# Patient Record
Sex: MALE | Race: WHITE | NOT HISPANIC OR LATINO | URBAN - METROPOLITAN AREA
[De-identification: names, ages, dates, MRNs, and addresses within clinical notes are randomized per-mention and may not be internally consistent; named-entity substitution may affect disease eponyms.]

---

## 2019-06-01 ENCOUNTER — TRANSCRIBE ORDERS (OUTPATIENT)
Dept: ADMINISTRATIVE | Facility: HOSPITAL | Age: 34
End: 2019-06-01

## 2019-06-01 DIAGNOSIS — Z00.00 WELLNESS EXAMINATION: Primary | ICD-10-CM

## 2019-07-03 ENCOUNTER — APPOINTMENT (OUTPATIENT)
Dept: CT IMAGING | Facility: HOSPITAL | Age: 34
End: 2019-07-03

## 2019-07-03 ENCOUNTER — APPOINTMENT (OUTPATIENT)
Dept: CARDIOLOGY | Facility: HOSPITAL | Age: 34
End: 2019-07-03

## 2020-05-05 ENCOUNTER — APPOINTMENT (OUTPATIENT)
Dept: CARDIOLOGY | Facility: HOSPITAL | Age: 35
End: 2020-05-05

## 2020-05-05 ENCOUNTER — APPOINTMENT (OUTPATIENT)
Dept: CT IMAGING | Facility: HOSPITAL | Age: 35
End: 2020-05-05

## 2022-03-06 ENCOUNTER — HOSPITAL ENCOUNTER (OUTPATIENT)
Facility: HOSPITAL | Age: 37
Setting detail: OBSERVATION
Discharge: HOME | End: 2022-03-08
Attending: EMERGENCY MEDICINE | Admitting: INTERNAL MEDICINE

## 2022-03-06 DIAGNOSIS — R11.2 NAUSEA AND VOMITING, INTRACTABILITY OF VOMITING NOT SPECIFIED, UNSPECIFIED VOMITING TYPE: ICD-10-CM

## 2022-03-06 DIAGNOSIS — M62.82 NON-TRAUMATIC RHABDOMYOLYSIS: Primary | ICD-10-CM

## 2022-03-06 DIAGNOSIS — N17.9 AKI (ACUTE KIDNEY INJURY): ICD-10-CM

## 2022-03-06 LAB
ALBUMIN SERPL-MCNC: 4.9 G/DL (ref 3.5–5.2)
ALBUMIN/GLOB SERPL: 1.8 G/DL
ALP SERPL-CCNC: 65 U/L (ref 39–117)
ALT SERPL W P-5'-P-CCNC: 46 U/L (ref 1–41)
ANION GAP SERPL CALCULATED.3IONS-SCNC: 12 MMOL/L (ref 5–15)
AST SERPL-CCNC: 61 U/L (ref 1–40)
B PARAPERT DNA SPEC QL NAA+PROBE: NOT DETECTED
B PERT DNA SPEC QL NAA+PROBE: NOT DETECTED
BASOPHILS # BLD AUTO: 0 10*3/MM3 (ref 0–0.2)
BASOPHILS NFR BLD AUTO: 0.2 % (ref 0–1.5)
BILIRUB SERPL-MCNC: 1 MG/DL (ref 0–1.2)
BILIRUB UR QL STRIP: NEGATIVE
BUN SERPL-MCNC: 21 MG/DL (ref 6–20)
BUN/CREAT SERPL: 16 (ref 7–25)
C PNEUM DNA NPH QL NAA+NON-PROBE: NOT DETECTED
CALCIUM SPEC-SCNC: 9.2 MG/DL (ref 8.6–10.5)
CHLORIDE SERPL-SCNC: 101 MMOL/L (ref 98–107)
CK SERPL-CCNC: 1035 U/L (ref 20–200)
CLARITY UR: CLEAR
CO2 SERPL-SCNC: 27 MMOL/L (ref 22–29)
COLOR UR: ABNORMAL
CREAT SERPL-MCNC: 1.31 MG/DL (ref 0.76–1.27)
DEPRECATED RDW RBC AUTO: 38.9 FL (ref 37–54)
EGFRCR SERPLBLD CKD-EPI 2021: 71.9 ML/MIN/1.73
EOSINOPHIL # BLD AUTO: 0.1 10*3/MM3 (ref 0–0.4)
EOSINOPHIL NFR BLD AUTO: 0.6 % (ref 0.3–6.2)
ERYTHROCYTE [DISTWIDTH] IN BLOOD BY AUTOMATED COUNT: 12.6 % (ref 12.3–15.4)
FLUAV SUBTYP SPEC NAA+PROBE: NOT DETECTED
FLUBV RNA ISLT QL NAA+PROBE: NOT DETECTED
GLOBULIN UR ELPH-MCNC: 2.7 GM/DL
GLUCOSE SERPL-MCNC: 86 MG/DL (ref 65–99)
GLUCOSE UR STRIP-MCNC: NEGATIVE MG/DL
HADV DNA SPEC NAA+PROBE: NOT DETECTED
HCOV 229E RNA SPEC QL NAA+PROBE: NOT DETECTED
HCOV HKU1 RNA SPEC QL NAA+PROBE: NOT DETECTED
HCOV NL63 RNA SPEC QL NAA+PROBE: NOT DETECTED
HCOV OC43 RNA SPEC QL NAA+PROBE: NOT DETECTED
HCT VFR BLD AUTO: 45 % (ref 37.5–51)
HGB BLD-MCNC: 15.6 G/DL (ref 13–17.7)
HGB UR QL STRIP.AUTO: NEGATIVE
HMPV RNA NPH QL NAA+NON-PROBE: NOT DETECTED
HPIV1 RNA ISLT QL NAA+PROBE: NOT DETECTED
HPIV2 RNA SPEC QL NAA+PROBE: NOT DETECTED
HPIV3 RNA NPH QL NAA+PROBE: NOT DETECTED
HPIV4 P GENE NPH QL NAA+PROBE: NOT DETECTED
KETONES UR QL STRIP: ABNORMAL
LEUKOCYTE ESTERASE UR QL STRIP.AUTO: NEGATIVE
LIPASE SERPL-CCNC: 30 U/L (ref 13–60)
LYMPHOCYTES # BLD AUTO: 0.6 10*3/MM3 (ref 0.7–3.1)
LYMPHOCYTES NFR BLD AUTO: 5.3 % (ref 19.6–45.3)
M PNEUMO IGG SER IA-ACNC: NOT DETECTED
MCH RBC QN AUTO: 30.2 PG (ref 26.6–33)
MCHC RBC AUTO-ENTMCNC: 34.5 G/DL (ref 31.5–35.7)
MCV RBC AUTO: 87.4 FL (ref 79–97)
MONOCYTES # BLD AUTO: 0.4 10*3/MM3 (ref 0.1–0.9)
MONOCYTES NFR BLD AUTO: 3.3 % (ref 5–12)
NEUTROPHILS NFR BLD AUTO: 10.6 10*3/MM3 (ref 1.7–7)
NEUTROPHILS NFR BLD AUTO: 90.6 % (ref 42.7–76)
NITRITE UR QL STRIP: NEGATIVE
NRBC BLD AUTO-RTO: 0.2 /100 WBC (ref 0–0.2)
PH UR STRIP.AUTO: 6 [PH] (ref 5–8)
PLATELET # BLD AUTO: 250 10*3/MM3 (ref 140–450)
PMV BLD AUTO: 8.6 FL (ref 6–12)
POTASSIUM SERPL-SCNC: 4.2 MMOL/L (ref 3.5–5.2)
PROT SERPL-MCNC: 7.6 G/DL (ref 6–8.5)
PROT UR QL STRIP: NEGATIVE
RBC # BLD AUTO: 5.15 10*6/MM3 (ref 4.14–5.8)
RHINOVIRUS RNA SPEC NAA+PROBE: NOT DETECTED
RSV RNA NPH QL NAA+NON-PROBE: NOT DETECTED
SARS-COV-2 RNA NPH QL NAA+NON-PROBE: NOT DETECTED
SODIUM SERPL-SCNC: 140 MMOL/L (ref 136–145)
SP GR UR STRIP: 1.03 (ref 1–1.03)
UROBILINOGEN UR QL STRIP: ABNORMAL
WBC NRBC COR # BLD: 11.7 10*3/MM3 (ref 3.4–10.8)

## 2022-03-06 PROCEDURE — 82550 ASSAY OF CK (CPK): CPT | Performed by: PHYSICIAN ASSISTANT

## 2022-03-06 PROCEDURE — 81003 URINALYSIS AUTO W/O SCOPE: CPT | Performed by: PHYSICIAN ASSISTANT

## 2022-03-06 PROCEDURE — 51798 US URINE CAPACITY MEASURE: CPT

## 2022-03-06 PROCEDURE — 80053 COMPREHEN METABOLIC PANEL: CPT | Performed by: PHYSICIAN ASSISTANT

## 2022-03-06 PROCEDURE — P9612 CATHETERIZE FOR URINE SPEC: HCPCS

## 2022-03-06 PROCEDURE — 83690 ASSAY OF LIPASE: CPT | Performed by: PHYSICIAN ASSISTANT

## 2022-03-06 PROCEDURE — 25010000002 ONDANSETRON PER 1 MG: Performed by: PHYSICIAN ASSISTANT

## 2022-03-06 PROCEDURE — 0202U NFCT DS 22 TRGT SARS-COV-2: CPT | Performed by: PHYSICIAN ASSISTANT

## 2022-03-06 PROCEDURE — 85025 COMPLETE CBC W/AUTO DIFF WBC: CPT | Performed by: PHYSICIAN ASSISTANT

## 2022-03-06 PROCEDURE — 99284 EMERGENCY DEPT VISIT MOD MDM: CPT

## 2022-03-06 PROCEDURE — 96374 THER/PROPH/DIAG INJ IV PUSH: CPT

## 2022-03-06 RX ORDER — SODIUM CHLORIDE 0.9 % (FLUSH) 0.9 %
10 SYRINGE (ML) INJECTION AS NEEDED
Status: DISCONTINUED | OUTPATIENT
Start: 2022-03-06 | End: 2022-03-08 | Stop reason: HOSPADM

## 2022-03-06 RX ORDER — ONDANSETRON 2 MG/ML
4 INJECTION INTRAMUSCULAR; INTRAVENOUS ONCE
Status: COMPLETED | OUTPATIENT
Start: 2022-03-06 | End: 2022-03-06

## 2022-03-06 RX ADMIN — ONDANSETRON 4 MG: 2 INJECTION INTRAMUSCULAR; INTRAVENOUS at 22:07

## 2022-03-06 RX ADMIN — SODIUM CHLORIDE 1000 ML: 9 INJECTION, SOLUTION INTRAVENOUS at 22:44

## 2022-03-06 RX ADMIN — SODIUM CHLORIDE 500 ML: 9 INJECTION, SOLUTION INTRAVENOUS at 22:03

## 2022-03-07 ENCOUNTER — APPOINTMENT (OUTPATIENT)
Dept: ULTRASOUND IMAGING | Facility: HOSPITAL | Age: 37
End: 2022-03-07

## 2022-03-07 PROBLEM — M62.82 NON-TRAUMATIC RHABDOMYOLYSIS: Status: ACTIVE | Noted: 2022-03-07

## 2022-03-07 LAB
ANION GAP SERPL CALCULATED.3IONS-SCNC: 10 MMOL/L (ref 5–15)
BUN SERPL-MCNC: 13 MG/DL (ref 6–20)
BUN/CREAT SERPL: 11.8 (ref 7–25)
CALCIUM SPEC-SCNC: 7.7 MG/DL (ref 8.6–10.5)
CHLORIDE SERPL-SCNC: 104 MMOL/L (ref 98–107)
CK SERPL-CCNC: 549 U/L (ref 20–200)
CO2 SERPL-SCNC: 26 MMOL/L (ref 22–29)
CREAT SERPL-MCNC: 1.1 MG/DL (ref 0.76–1.27)
EGFRCR SERPLBLD CKD-EPI 2021: 88.7 ML/MIN/1.73
GLUCOSE SERPL-MCNC: 94 MG/DL (ref 65–99)
POTASSIUM SERPL-SCNC: 3.7 MMOL/L (ref 3.5–5.2)
SODIUM SERPL-SCNC: 140 MMOL/L (ref 136–145)
TSH SERPL DL<=0.05 MIU/L-ACNC: 1.52 UIU/ML (ref 0.27–4.2)

## 2022-03-07 PROCEDURE — 82550 ASSAY OF CK (CPK): CPT | Performed by: INTERNAL MEDICINE

## 2022-03-07 PROCEDURE — G0378 HOSPITAL OBSERVATION PER HR: HCPCS

## 2022-03-07 PROCEDURE — 80048 BASIC METABOLIC PNL TOTAL CA: CPT | Performed by: INTERNAL MEDICINE

## 2022-03-07 PROCEDURE — 96361 HYDRATE IV INFUSION ADD-ON: CPT

## 2022-03-07 PROCEDURE — 80069 RENAL FUNCTION PANEL: CPT | Performed by: INTERNAL MEDICINE

## 2022-03-07 PROCEDURE — 76775 US EXAM ABDO BACK WALL LIM: CPT

## 2022-03-07 PROCEDURE — 63710000001 ONDANSETRON PER 8 MG: Performed by: NURSE PRACTITIONER

## 2022-03-07 PROCEDURE — 85025 COMPLETE CBC W/AUTO DIFF WBC: CPT | Performed by: NURSE PRACTITIONER

## 2022-03-07 PROCEDURE — 84443 ASSAY THYROID STIM HORMONE: CPT | Performed by: INTERNAL MEDICINE

## 2022-03-07 RX ORDER — ACETAMINOPHEN 325 MG/1
650 TABLET ORAL EVERY 4 HOURS PRN
Status: DISCONTINUED | OUTPATIENT
Start: 2022-03-07 | End: 2022-03-08 | Stop reason: HOSPADM

## 2022-03-07 RX ORDER — NITROGLYCERIN 0.4 MG/1
0.4 TABLET SUBLINGUAL
Status: DISCONTINUED | OUTPATIENT
Start: 2022-03-07 | End: 2022-03-08 | Stop reason: HOSPADM

## 2022-03-07 RX ORDER — SODIUM CHLORIDE 9 MG/ML
150 INJECTION, SOLUTION INTRAVENOUS CONTINUOUS
Status: DISCONTINUED | OUTPATIENT
Start: 2022-03-07 | End: 2022-03-08 | Stop reason: HOSPADM

## 2022-03-07 RX ORDER — ONDANSETRON 2 MG/ML
4 INJECTION INTRAMUSCULAR; INTRAVENOUS EVERY 6 HOURS PRN
Status: DISCONTINUED | OUTPATIENT
Start: 2022-03-07 | End: 2022-03-08 | Stop reason: HOSPADM

## 2022-03-07 RX ORDER — SODIUM CHLORIDE 0.9 % (FLUSH) 0.9 %
3-10 SYRINGE (ML) INJECTION AS NEEDED
Status: DISCONTINUED | OUTPATIENT
Start: 2022-03-07 | End: 2022-03-08 | Stop reason: HOSPADM

## 2022-03-07 RX ORDER — ONDANSETRON 4 MG/1
4 TABLET, FILM COATED ORAL EVERY 6 HOURS PRN
Status: DISCONTINUED | OUTPATIENT
Start: 2022-03-07 | End: 2022-03-08 | Stop reason: HOSPADM

## 2022-03-07 RX ORDER — HYDRALAZINE HYDROCHLORIDE 20 MG/ML
10 INJECTION INTRAMUSCULAR; INTRAVENOUS EVERY 6 HOURS PRN
Status: DISCONTINUED | OUTPATIENT
Start: 2022-03-07 | End: 2022-03-08 | Stop reason: HOSPADM

## 2022-03-07 RX ORDER — ERGOCALCIFEROL (VITAMIN D2) 10 MCG
400 TABLET ORAL DAILY
COMMUNITY

## 2022-03-07 RX ORDER — SODIUM CHLORIDE 9 MG/ML
50 INJECTION, SOLUTION INTRAVENOUS CONTINUOUS
Status: DISCONTINUED | OUTPATIENT
Start: 2022-03-07 | End: 2022-03-07

## 2022-03-07 RX ORDER — SODIUM CHLORIDE 0.9 % (FLUSH) 0.9 %
3 SYRINGE (ML) INJECTION EVERY 12 HOURS SCHEDULED
Status: DISCONTINUED | OUTPATIENT
Start: 2022-03-07 | End: 2022-03-08 | Stop reason: HOSPADM

## 2022-03-07 RX ADMIN — ONDANSETRON HYDROCHLORIDE 4 MG: 4 TABLET, FILM COATED ORAL at 20:32

## 2022-03-07 RX ADMIN — SODIUM CHLORIDE 150 ML/HR: 9 INJECTION, SOLUTION INTRAVENOUS at 17:36

## 2022-03-07 RX ADMIN — Medication 3 ML: at 08:27

## 2022-03-07 RX ADMIN — SODIUM CHLORIDE 150 ML/HR: 9 INJECTION, SOLUTION INTRAVENOUS at 08:27

## 2022-03-07 RX ADMIN — SODIUM CHLORIDE 1000 ML: 9 INJECTION, SOLUTION INTRAVENOUS at 08:27

## 2022-03-07 RX ADMIN — SODIUM CHLORIDE 50 ML/HR: 9 INJECTION, SOLUTION INTRAVENOUS at 05:10

## 2022-03-07 RX ADMIN — Medication 3 ML: at 20:29

## 2022-03-07 RX ADMIN — ONDANSETRON HYDROCHLORIDE 4 MG: 4 TABLET, FILM COATED ORAL at 14:15

## 2022-03-07 NOTE — CASE MANAGEMENT/SOCIAL WORK
Discharge Planning Assessment   Narinder     Patient Name: Jac Perez  MRN: [id number]  Today's Date: 3/7/2022    Admit Date: 3/6/2022     Discharge Needs Assessment     Row Name 03/07/22 1541       Living Environment    People in Home spouse    Current Living Arrangements home    Primary Care Provided by self    Provides Primary Care For no one    Family Caregiver if Needed spouse    Quality of Family Relationships helpful    Able to Return to Prior Arrangements yes       Resource/Environmental Concerns    Resource/Environmental Concerns none    Transportation Concerns none       Transition Planning    Patient/Family Anticipates Transition to home with family    Patient/Family Anticipated Services at Transition none    Transportation Anticipated family or friend will provide       Discharge Needs Assessment    Readmission Within the Last 30 Days no previous admission in last 30 days    Equipment Currently Used at Home none    Concerns to be Addressed denies needs/concerns at this time;no discharge needs identified    Anticipated Changes Related to Illness none    Equipment Needed After Discharge none    Provided Post Acute Provider List? N/A               Discharge Plan     Row Name 03/07/22 1541       Plan    Plan Anticipate routine home    Patient/Family in Agreement with Plan yes    Plan Comments Met with patient at bedside, reports he lives at home with spouse. I with ADLs, still drives. Spouse to transport home on d/c. PCP and pharmacy confirmed. No issues affording food or medications. Currently denies any d/c needs or concerns. CM to follow.                Expected Discharge Date and Time     Expected Discharge Date Expected Discharge Time    Mar 8, 2022          Demographic Summary     Row Name 03/07/22 1540       General Information    Admission Type observation    Arrived From emergency department    Referral Source admission list    Reason for Consult discharge planning    Preferred Language English                Functional Status     Row Name 03/07/22 1541       Functional Status    Usual Activity Tolerance good    Current Activity Tolerance good       Functional Status, IADL    Medications independent    Meal Preparation independent    Housekeeping independent    Laundry independent    Shopping independent            Met with patient in room wearing PPE: mask    Maintained distance greater than six feet and spent less than 15 minutes in the room.          Alexa Cooney RN

## 2022-03-07 NOTE — H&P
Patient Care Team:  Danitza Garcia MD as PCP - General  Danitza Garcia MD as PCP - Family Medicine  Pato Cardoza MD as Consulting Physician (Nephrology)    Chief complaint No urine output    Subjective     Patient is a 36 y.o. male, previously healthy,  who presents with generalized weakness, vomiting and no urine output during the course of the day.  He awoke the morning of admission with mild nausea. He took 800 mg ibuprofen and then ran outside for 13 miles, which is not unusual for him.  Afterward he had one episode of diarrhea and multiple episodes of vomiting. He had no known ill exposures, no fever/chills.  In the ER his CPK was elevated, creatinine was elevated.  He has been treated over night with IV fluids.  He is better this am but still nauseous with poor appetite. He has urinated twice since admission.  BP is low.    Review of Systems   Constitutional: Positive for activity change, appetite change and fatigue. Negative for chills, diaphoresis and fever.   HENT: Negative for facial swelling.    Eyes: Negative for visual disturbance.   Respiratory: Negative for cough, shortness of breath and stridor.    Cardiovascular: Negative for chest pain, palpitations and leg swelling.   Gastrointestinal: Positive for diarrhea, nausea and vomiting. Negative for constipation.   Endocrine: Negative for polyuria.   Genitourinary: Negative for dysuria.   Musculoskeletal: Positive for arthralgias and myalgias. Negative for gait problem and neck pain.   Skin: Negative for rash and wound.   Neurological: Negative for dizziness, light-headedness and headaches.   Psychiatric/Behavioral: Negative for confusion.          History  History reviewed. No pertinent past medical history.  History reviewed. No pertinent surgical history.  History reviewed. No pertinent family history.  Social History     Tobacco Use   • Smoking status: Never Smoker   • Smokeless tobacco: Never Used   Substance Use Topics   • Drug use: Never      Medications Prior to Admission   Medication Sig Dispense Refill Last Dose   • Ped Multivitamins-Fl-Iron (multivitamin with fluoride/iron) 0.25-10 MG/ML solution solution Take  by mouth Daily.      • Vitamin D, Cholecalciferol, (CHOLECALCIFEROL) 10 MCG (400 UNIT) tablet Take 400 Units by mouth Daily.        Allergies:  Patient has no known allergies.    Objective     Vital Signs  Temp:  [98 °F (36.7 °C)-100.7 °F (38.2 °C)] 99.2 °F (37.3 °C)  Heart Rate:  [55-90] 84  Resp:  [16-19] 18  BP: ()/(50-91) 91/56     Physical Exam:      General Appearance:    Alert, cooperative, in no acute distress   Head:    Normocephalic, without obvious abnormality, atraumatic   Eyes:            Lids and lashes normal, conjunctivae and sclerae normal, no   icterus, no pallor, corneas clear, PERRLA   Ears:    Ears appear intact with no abnormalities noted   Throat:   No oral lesions, no thrush, oral mucosa moist   Neck:   No adenopathy, supple, trachea midline, no thyromegaly, no   carotid bruit, no JVD   Lungs:     Clear to auscultation,respirations regular, even and                  unlabored    Heart:    Regular rhythm and normal rate, normal S1 and S2, no            murmur, no gallop, no rub, no click   Chest Wall:    No abnormalities observed   Abdomen:     Normal bowel sounds, no masses, no organomegaly, soft        non-tender, non-distended, no guarding, no rebound                tenderness   Extremities:   Moves all extremities well, no edema, no cyanosis, no             redness   Pulses:   Pulses palpable and equal bilaterally   Skin:   No bleeding, bruising or rash   Lymph nodes:   No palpable adenopathy   Neurologic:   Cranial nerves 2 - 12 grossly intact, sensation intact, DTR       present and equal bilaterally       Results Review:     Imaging Results (Last 24 Hours)     Procedure Component Value Units Date/Time    US Renal Bilateral [[id number]] Collected: 03/07/22 0942     Updated: 03/07/22 0946    Narrative:       DATE OF EXAM:  3/7/2022 9:28 AM     PROCEDURE:  US RENAL BILATERAL-     INDICATIONS:  urbano; M62.82-Rhabdomyolysis; R11.2-Nausea with vomiting, unspecified;  N17.9-Acute kidney failure, unspecified     COMPARISON:  CT abdomen and pelvis 12/6/2012.     TECHNIQUE:   Grayscale and color Doppler ultrasound evaluation of the kidneys and  urinary bladder was performed.        FINDINGS:  The right kidney measures 11.2 x 4.9 x 5.6 cm.. The left kidney   measures 10.5 x 4.2 x 5.1 cm. Both kidneys maintain normal cortical  thickness and echotexture. No cystic or solid renal lesion, shadowing  stone, or hydronephrosis is seen. Urinary bladder has a normal  appearance with a prevoid volume of 200 cc.             Impression:      Normal renal ultrasound.     Electronically Signed By-Monie Brunner MD On:3/7/2022 9:44 AM  This report was finalized on [id number] by  Monie Brunner MD.           Lab Results (last 24 hours)     Procedure Component Value Units Date/Time    Respiratory Panel PCR w/COVID-19(SARS-CoV-2) [facility] In-House, NP Swab in UTM/VTM, 3-4 HR TAT - Swab, Nasopharynx [[id number]]  (Normal) Collected: 03/06/22 2212    Specimen: Swab from Nasopharynx Updated: 03/06/22 2305     ADENOVIRUS, PCR Not Detected     Coronavirus 229E Not Detected     Coronavirus HKU1 Not Detected     Coronavirus NL63 Not Detected     Coronavirus OC43 Not Detected     COVID19 Not Detected     Human Metapneumovirus Not Detected     Human Rhinovirus/Enterovirus Not Detected     Influenza A PCR Not Detected     Influenza B PCR Not Detected     Parainfluenza Virus 1 Not Detected     Parainfluenza Virus 2 Not Detected     Parainfluenza Virus 3 Not Detected     Parainfluenza Virus 4 Not Detected     RSV, PCR Not Detected     Bordetella pertussis pcr Not Detected     Bordetella parapertussis PCR Not Detected     Chlamydophila pneumoniae PCR Not Detected     Mycoplasma pneumo by PCR Not Detected    Narrative:      In the setting  of a positive respiratory panel with a viral infection PLUS a negative procalcitonin without other underlying concern for bacterial infection, consider observing off antibiotics or discontinuation of antibiotics and continue supportive care. If the respiratory panel is positive for atypical bacterial infection (Bordetella pertussis, Chlamydophila pneumoniae, or Mycoplasma pneumoniae), consider antibiotic de-escalation to target atypical bacterial infection.    Urinalysis With Culture If Indicated - Urine, Catheter In/Out [[id number]]  (Abnormal) Collected: 03/06/22 2242    Specimen: Urine, Catheter In/Out Updated: 03/06/22 2250     Color, UA Dark Yellow     Appearance, UA Clear     pH, UA 6.0     Specific Gravity, UA 1.033     Glucose, UA Negative     Ketones, UA Trace     Bilirubin, UA Negative     Blood, UA Negative     Protein, UA Negative     Leuk Esterase, UA Negative     Nitrite, UA Negative     Urobilinogen, UA 1.0 E.U./dL    Narrative:      Urine microscopic not indicated.    Comprehensive Metabolic Panel [[id number]]  (Abnormal) Collected: 03/06/22 2201    Specimen: Blood Updated: 03/06/22 2250     Glucose 86 mg/dL      BUN 21 mg/dL      Creatinine 1.31 mg/dL      Sodium 140 mmol/L      Potassium 4.2 mmol/L      Chloride 101 mmol/L      CO2 27.0 mmol/L      Calcium 9.2 mg/dL      Total Protein 7.6 g/dL      Albumin 4.90 g/dL      ALT (SGPT) 46 U/L      AST (SGOT) 61 U/L      Alkaline Phosphatase 65 U/L      Total Bilirubin 1.0 mg/dL      Globulin 2.7 gm/dL      A/G Ratio 1.8 g/dL      BUN/Creatinine Ratio 16.0     Anion Gap 12.0 mmol/L      eGFR 71.9 mL/min/1.73      Comment: National Kidney Foundation and American Society of Nephrology (ASN) Task Force recommended calculation based on the Chronic Kidney Disease Epidemiology Collaboration (CKD-EPI) equation refit without adjustment for race.       Narrative:      GFR Normal >60  Chronic Kidney Disease <60  Kidney Failure <15      Lipase [[id number]]  (Normal)  Collected: 03/06/22 2201    Specimen: Blood Updated: 03/06/22 2250     Lipase 30 U/L     CK [[id number]]  (Abnormal) Collected: 03/06/22 2201    Specimen: Blood Updated: 03/06/22 2250     Creatine Kinase 1,035 U/L     CBC & Differential [[id number]]  (Abnormal) Collected: 03/06/22 2201    Specimen: Blood Updated: 03/06/22 2218    Narrative:      The following orders were created for panel order CBC & Differential.  Procedure                               Abnormality         Status                     ---------                               -----------         ------                     CBC Auto Differential[[id number]]        Abnormal            Final result                 Please view results for these tests on the individual orders.    CBC Auto Differential [[id number]]  (Abnormal) Collected: 03/06/22 2201    Specimen: Blood Updated: 03/06/22 2218     WBC 11.70 10*3/mm3      RBC 5.15 10*6/mm3      Hemoglobin 15.6 g/dL      Hematocrit 45.0 %      MCV 87.4 fL      MCH 30.2 pg      MCHC 34.5 g/dL      RDW 12.6 %      RDW-SD 38.9 fl      MPV 8.6 fL      Platelets 250 10*3/mm3      Neutrophil % 90.6 %      Lymphocyte % 5.3 %      Monocyte % 3.3 %      Eosinophil % 0.6 %      Basophil % 0.2 %      Neutrophils, Absolute 10.60 10*3/mm3      Lymphocytes, Absolute 0.60 10*3/mm3      Monocytes, Absolute 0.40 10*3/mm3      Eosinophils, Absolute 0.10 10*3/mm3      Basophils, Absolute 0.00 10*3/mm3      nRBC 0.2 /100 WBC            I reviewed the patient's new clinical results.    Assessment/Plan       Non-traumatic rhabdomyolysis  - repeat CPK, repeat fluid bolus; nephrology to follow; d/c NSAID's;   Vomiting - antiemetics; clear liquid diet, likely viral gastritis  Hypotension - aggressive IV fluids        I discussed the patient's findings and my recommendations with patient.     Danitza Garcia MD  03/07/22  12:36 EST

## 2022-03-07 NOTE — PLAN OF CARE
Problem: Adult Inpatient Plan of Care  Goal: Plan of Care Review  Outcome: Ongoing, Progressing  Goal: Patient-Specific Goal (Individualized)  Outcome: Ongoing, Progressing  Goal: Absence of Hospital-Acquired Illness or Injury  Outcome: Ongoing, Progressing  Intervention: Identify and Manage Fall Risk  Recent Flowsheet Documentation  Taken 3/7/2022 1300 by Cecilia Mas LPN  Safety Promotion/Fall Prevention: safety round/check completed  Taken 3/7/2022 1100 by Cecilia Mas LPN  Safety Promotion/Fall Prevention: safety round/check completed  Taken 3/7/2022 0900 by Cecilia Mas LPN  Safety Promotion/Fall Prevention: safety round/check completed  Taken 3/7/2022 0755 by Cecilia Mas LPN  Safety Promotion/Fall Prevention: safety round/check completed  Intervention: Prevent Skin Injury  Recent Flowsheet Documentation  Taken 3/7/2022 1300 by Cecilia Mas LPN  Body Position: position changed independently  Taken 3/7/2022 1100 by Cecilia Mas LPN  Body Position:   left   position changed independently  Taken 3/7/2022 0755 by Cecilia Mas LPN  Body Position: position changed independently  Intervention: Prevent and Manage VTE (Venous Thromboembolism) Risk  Recent Flowsheet Documentation  Taken 3/7/2022 1300 by Cecilia Mas LPN  Activity Management:   activity adjusted per tolerance   activity encouraged  Taken 3/7/2022 1100 by Cecilia Mas LPN  Activity Management:   activity adjusted per tolerance   activity encouraged  Taken 3/7/2022 0900 by Cecilia Mas LPN  Activity Management:   activity adjusted per tolerance   activity encouraged  Taken 3/7/2022 0755 by Cecilia Mas LPN  Activity Management:   activity adjusted per tolerance   activity encouraged  VTE Prevention/Management:   bilateral   dorsiflexion/plantar flexion performed  Intervention: Prevent Infection  Recent Flowsheet Documentation  Taken 3/7/2022 0755 by Cecilia Mas LPN  Infection Prevention:   hand hygiene promoted   single patient room  provided  Goal: Optimal Comfort and Wellbeing  Outcome: Ongoing, Progressing  Intervention: Provide Person-Centered Care  Recent Flowsheet Documentation  Taken 3/7/2022 0755 by Cecilia Mas LPN  Trust Relationship/Rapport:   care explained   choices provided  Goal: Readiness for Transition of Care  Outcome: Ongoing, Progressing     Problem: Fluid and Electrolyte Imbalance (Acute Kidney Injury/Impairment)  Goal: Fluid and Electrolyte Balance  Outcome: Ongoing, Progressing  Intervention: Monitor and Manage Fluid and Electrolyte Balance  Recent Flowsheet Documentation  Taken 3/7/2022 0755 by Cecilia Mas LPN  Fluid/Electrolyte Management: fluids provided     Problem: Oral Intake Inadequate (Acute Kidney Injury/Impairment)  Goal: Optimal Nutrition Intake  Outcome: Ongoing, Progressing     Problem: Renal Function Impairment (Acute Kidney Injury/Impairment)  Goal: Effective Renal Function  Outcome: Ongoing, Progressing  Intervention: Monitor and Support Renal Function  Recent Flowsheet Documentation  Taken 3/7/2022 1100 by Cecilia Mas LPN  Medication Review/Management: medications reviewed  Taken 3/7/2022 0900 by Cecilia Mas LPN  Medication Review/Management: medications reviewed  Taken 3/7/2022 0755 by Cecilia Mas LPN  Medication Review/Management: medications reviewed     Problem: Pain Acute  Goal: Acceptable Pain Control and Functional Ability  Outcome: Ongoing, Progressing  Intervention: Prevent or Manage Pain  Recent Flowsheet Documentation  Taken 3/7/2022 1100 by Cecilia Mas LPN  Medication Review/Management: medications reviewed  Taken 3/7/2022 0900 by Cecilia Mas LPN  Medication Review/Management: medications reviewed  Taken 3/7/2022 0755 by Cecilia Mas LPN  Medication Review/Management: medications reviewed  Intervention: Optimize Psychosocial Wellbeing  Recent Flowsheet Documentation  Taken 3/7/2022 0755 by eCcilia Mas LPN  Supportive Measures:   active listening utilized   decision-making  supported  Diversional Activities:   smartphone   television  Spiritual Activities Assistance: affirmation provided   Goal Outcome Evaluation:   Pt a/o. Wife at bedside. NS @ 150. Creatinine Kinase has improved. Nephrology following. Clear liquid diet. Will monitor.

## 2022-03-07 NOTE — NURSING NOTE
Patient had fever of 100.7.  Call placed to Dr. Rodriguez.  No new orders, will be rounding shortly, believes noninfectious cause.

## 2022-03-07 NOTE — ED PROVIDER NOTES
Subjective     Patient is a 36-year-old male comes in complaining of trouble urinating for about the past 12 hours. Patient states that this morning he last urinated when he got up without issue. Patient then states he ran 13 miles which he does about 2-3 times per week and this is not unusual for him. Patient states that he drink about a gallon of water before taking a nap and when he woke up from the nap he had nausea and vomiting x3. Patient states he had a small amount of bright red blood on the last time he vomited. Patient also reports  diarrhea when he woke up from his nap as well. Patient states that he has not had the feeling to urinate and has tried to urinate but cannot. Patient denies any abdominal pain, chest pain, shortness of breath, cough, dysuria over the past few days. Patient does report some urinary frequency but believes this is usual for him the last several days. Patient denies any fever,  or headache. Patient states he feels generally fatigued and chilled. Patient states he is never had issues urinating in the past.      Review of Systems   Constitutional: Positive for chills and fatigue. Negative for fever.   HENT: Negative for congestion, sore throat, tinnitus and trouble swallowing.    Eyes: Negative for photophobia, discharge and visual disturbance.   Respiratory: Negative for cough, chest tightness, shortness of breath and wheezing.    Cardiovascular: Negative for chest pain, palpitations and leg swelling.   Gastrointestinal: Positive for diarrhea, nausea and vomiting. Negative for abdominal pain and blood in stool.   Genitourinary: Positive for difficulty urinating and frequency. Negative for dysuria, flank pain, penile discharge, penile pain, penile swelling, scrotal swelling, testicular pain and urgency.   Musculoskeletal: Negative for arthralgias and myalgias.   Skin: Negative for rash.   Neurological: Negative for dizziness, syncope, light-headedness and headaches.    Psychiatric/Behavioral: Negative for confusion.       No past medical history on file.    No Known Allergies    No past surgical history on file.    No family history on file.    Social History     Socioeconomic History   • Marital status:            Objective   Physical Exam  Vitals and nursing note reviewed.   Constitutional:       General: He is not in acute distress.     Appearance: He is well-developed. He is not diaphoretic.   HENT:      Head: Normocephalic and atraumatic.      Right Ear: External ear normal.      Left Ear: External ear normal.      Nose: Nose normal.      Mouth/Throat:      Pharynx: No oropharyngeal exudate.   Eyes:      Extraocular Movements: Extraocular movements intact.      Conjunctiva/sclera: Conjunctivae normal.      Pupils: Pupils are equal, round, and reactive to light.   Cardiovascular:      Rate and Rhythm: Normal rate and regular rhythm.      Pulses: Normal pulses.      Heart sounds: Normal heart sounds.      Comments: S1, S2 audible.  Pulmonary:      Effort: Pulmonary effort is normal. No respiratory distress.      Breath sounds: Normal breath sounds. No wheezing, rhonchi or rales.      Comments: On room air.  Abdominal:      General: Bowel sounds are normal. There is no distension.      Palpations: Abdomen is soft.      Tenderness: There is no abdominal tenderness. There is no guarding or rebound.   Musculoskeletal:         General: No tenderness or deformity. Normal range of motion.      Cervical back: Normal range of motion.      Right lower leg: No edema.      Left lower leg: No edema.   Skin:     General: Skin is warm.      Capillary Refill: Capillary refill takes less than 2 seconds.      Findings: No erythema or rash.   Neurological:      General: No focal deficit present.      Mental Status: He is alert and oriented to person, place, and time.      Cranial Nerves: No cranial nerve deficit.   Psychiatric:         Mood and Affect: Mood normal.         Behavior:  "Behavior normal.         Procedures           ED Course      /70   Pulse 69   Temp 98 °F (36.7 °C)   Resp 18   Ht 180.3 cm (71\")   Wt 96.2 kg (212 lb)   SpO2 100%   BMI 29.57 kg/m²   Labs Reviewed   CK - Abnormal; Notable for the following components:       Result Value    Creatine Kinase 1,035 (*)     All other components within normal limits   COMPREHENSIVE METABOLIC PANEL - Abnormal; Notable for the following components:    BUN 21 (*)     Creatinine 1.31 (*)     ALT (SGPT) 46 (*)     AST (SGOT) 61 (*)     All other components within normal limits    Narrative:     GFR Normal >60  Chronic Kidney Disease <60  Kidney Failure <15     URINALYSIS W/ CULTURE IF INDICATED - Abnormal; Notable for the following components:    Color, UA Dark Yellow (*)     Specific Gravity, UA 1.033 (*)     Ketones, UA Trace (*)     All other components within normal limits    Narrative:     Urine microscopic not indicated.   CBC WITH AUTO DIFFERENTIAL - Abnormal; Notable for the following components:    WBC 11.70 (*)     Neutrophil % 90.6 (*)     Lymphocyte % 5.3 (*)     Monocyte % 3.3 (*)     Neutrophils, Absolute 10.60 (*)     Lymphocytes, Absolute 0.60 (*)     All other components within normal limits   RESPIRATORY PANEL PCR W/ COVID-19 (SARS-COV-2) JOSE ANTONIO/LEANNA/VALE/PAD/COR/MAD/PITA IN-HOUSE, NP SWAB IN [facility], 3-4 HR TAT - Normal    Narrative:     In the setting of a positive respiratory panel with a viral infection PLUS a negative procalcitonin without other underlying concern for bacterial infection, consider observing off antibiotics or discontinuation of antibiotics and continue supportive care. If the respiratory panel is positive for atypical bacterial infection (Bordetella pertussis, Chlamydophila pneumoniae, or Mycoplasma pneumoniae), consider antibiotic de-escalation to target atypical bacterial infection.   LIPASE - Normal   CBC AND DIFFERENTIAL    Narrative:     The following orders were created for panel order CBC & " "Differential.  Procedure                               Abnormality         Status                     ---------                               -----------         ------                     CBC Auto Differential[[id number]]        Abnormal            Final result                 Please view results for these tests on the individual orders.     No radiology results for the last day                                             MDM  Number of Diagnoses or Management Options     Amount and/or Complexity of Data Reviewed  Clinical lab tests: reviewed    Patient Progress  Patient progress: stable       Chart review:    EKG: Not indicated    Imaging: Not indicated  Labs: CK elevated 1035.  Respiratory viral panel COVID-19 swab negative.  UA unremarkable.  Lipase normal.  Serum creatinine elevated at 1.31 and AST and ALT slightly elevated 61 and 46 respectively.  CBC largely unremarkable for acute findings.    Vitals:  /70   Pulse 69   Temp 98 °F (36.7 °C)   Resp 18   Ht 180.3 cm (71\")   Wt 96.2 kg (212 lb)   SpO2 100%   BMI 29.57 kg/m²     Medications given:    Medications   sodium chloride 0.9 % flush 10 mL (has no administration in time range)   ondansetron (ZOFRAN) injection 4 mg (4 mg Intravenous Given 3/6/22 2207)   sodium chloride 0.9 % bolus 500 mL (0 mL Intravenous Stopped 3/6/22 2244)   sodium chloride 0.9 % bolus 1,000 mL (1,000 mL Intravenous New Bag 3/6/22 2244)       Procedures:    MDM: Patient is a 36-year-old male comes in complaining of decreased urine output and vomiting since earlier today.  Patient states he ran 13 miles this morning which is not unusual for him but afterwards he had vomiting and diarrhea.  CK elevated 1035.  Respiratory viral panel with COVID-19 swab negative.  UA unremarkable.  Lipase normal.  Serum creatinine elevated at 1.31 and AST and ALT slightly elevated 61 and 46 respectively.  CBC largely unremarkable for acute findings.  Patient given a total of 1500 mL normal saline " here in the ER as well as Zofran.  Patient appears to be in rhabdomyolysis with an FAINA.  Patient CO2 and anion gap are normal on lab work.  Patient appears no acute distress on initial and reevaluation.  Spoke with Dr. Rodriguez, who accepted patient behalf of patient's primary care provider, Dr. Cain for admission to hospital further work-up and treatment. See full discharge instructions for further details.  Results plan discussed with patient and is agreeable with plan.s    Final diagnoses:   Non-traumatic rhabdomyolysis   Nausea and vomiting, intractability of vomiting not specified, unspecified vomiting type   FAINA (acute kidney injury) ([facility])       ED Disposition  ED Disposition     ED Disposition   Decision to Admit    Condition   --    Comment   Level of Care: Med/Surg [1]   Admitting Physician: LITA CAIN [[id number]]   Attending Physician: LITA CAIN [[id number]]               No follow-up provider specified.       Medication List      No changes were made to your prescriptions during this visit.          Aguilar Lujan PA  03/06/22 [id number]

## 2022-03-07 NOTE — PLAN OF CARE
Goal Outcome Evaluation:  Plan of Care Reviewed With: patient     Problem: Adult Inpatient Plan of Care  Goal: Plan of Care Review  Outcome: Ongoing, Progressing  Flowsheets (Taken 3/7/2022 3333)  Progress: improving  Plan of Care Reviewed With: patient        Progress: improving

## 2022-03-07 NOTE — CONSULTS
NEPHROLOGY CONSULTATION-----[facility]    [facility]  [phone]  Pato Cardoza MD    Patient Care Team:  Danitza Garcia MD as PCP - General  Danitza Garcia MD as PCP - Family Medicine  Pato Cardoza MD as Consulting Physician (Nephrology)    CC/REASON FOR CONSULTATION: Elevated creatinine    PHYSICIAN REQUESTING CONSULTATION: Dr. Garcia    History of Present Illness  36-year-old male with no significant past medical history presented to the ER with decreased urination.  His creatinine was 1.3 admission.  His CK was little over thousand.  He apparently had a long run of 13 miles.  Denies any dysuria gross hematuria.  No chest pain shortness of air.  He had some nausea vomiting as well.  His urine has been dark prior to admission, is clearing up now    Review of Systems   As noted above otherwise 10 systems reviewed and were negative.    History reviewed. No pertinent past medical history.    History reviewed. No pertinent surgical history.    History reviewed. No pertinent family history.    Social History     Tobacco Use   • Smoking status: Never Smoker   • Smokeless tobacco: Never Used   Substance Use Topics   • Drug use: Never       Home Meds:   Medications Prior to Admission   Medication Sig Dispense Refill Last Dose   • Ped Multivitamins-Fl-Iron (multivitamin with fluoride/iron) 0.25-10 MG/ML solution solution Take  by mouth Daily.      • Vitamin D, Cholecalciferol, (CHOLECALCIFEROL) 10 MCG (400 UNIT) tablet Take 400 Units by mouth Daily.          Scheduled Meds:  sodium chloride, 3 mL, Intravenous, Q12H        Continuous Infusions:  sodium chloride, 150 mL/hr, Last Rate: 150 mL/hr (03/07/22 0827)        PRN Meds:  •  acetaminophen  •  hydrALAZINE  •  nitroglycerin  •  ondansetron **OR** ondansetron  •  sodium chloride  •  sodium chloride    Allergies:  Patient has no known allergies.    OBJECTIVE    Vital Signs  Temp:  [98 °F (36.7 °C)-100.7 °F  (38.2 °C)] 99.2 °F (37.3 °C)  Heart Rate:  [55-90] 84  Resp:  [16-19] 18  BP: ()/(50-91) 91/56    I/O this shift:  In: 120 [P.O.:120]  Out: 300 [Urine:300]  I/O last 3 completed shifts:  In: 1500 [IV Piggyback:1500]  Out: -     Physical Exam:  General Appearance: alert, appears stated age and cooperative  Head: normocephalic, without obvious abnormality and atraumatic  Eyes: conjunctivae and sclerae normal and no icterus  Neck: supple and no JVD  Lungs: clear to auscultation and respirations regular  Heart: regular rhythm & normal rate and normal S1, S2  Chest Wall: no abnormalities observed  Abdomen: normal bowel sounds and soft non-tender  Extremities: moves extremities well, no edema, no cyanosis  Skin: no bleeding, bruising or rash  Neurologic: Alert, and oriented. No focal deficits    Results Review:    I reviewed the patient's new clinical results.    WBC WBC   Date Value Ref Range Status   03/06/2022 11.70 (H) 3.40 - 10.80 10*3/mm3 Final      HGB Hemoglobin   Date Value Ref Range Status   03/06/2022 15.6 13.0 - 17.7 g/dL Final      HCT Hematocrit   Date Value Ref Range Status   03/06/2022 45.0 37.5 - 51.0 % Final      Platlets No results found for: LABPLAT   MCV MCV   Date Value Ref Range Status   03/06/2022 87.4 79.0 - 97.0 fL Final          Sodium Sodium   Date Value Ref Range Status   03/06/2022 140 136 - 145 mmol/L Final      Potassium Potassium   Date Value Ref Range Status   03/06/2022 4.2 3.5 - 5.2 mmol/L Final      Chloride Chloride   Date Value Ref Range Status   03/06/2022 101 98 - 107 mmol/L Final      CO2 CO2   Date Value Ref Range Status   03/06/2022 27.0 22.0 - 29.0 mmol/L Final      BUN BUN   Date Value Ref Range Status   03/06/2022 21 (H) 6 - 20 mg/dL Final      Creatinine Creatinine   Date Value Ref Range Status   03/06/2022 1.31 (H) 0.76 - 1.27 mg/dL Final      Calcium Calcium   Date Value Ref Range Status   03/06/2022 9.2 8.6 - 10.5 mg/dL Final      PO4 No results found for: CAPO4    Albumin Albumin   Date Value Ref Range Status   03/06/2022 4.90 3.50 - 5.20 g/dL Final      Magnesium No results found for: MG   Uric Acid No results found for: URICACID       Imaging Results (Last 72 Hours)     Procedure Component Value Units Date/Time    US Renal Bilateral [[id number]] Collected: 03/07/22 0942     Updated: 03/07/22 0946    Narrative:      DATE OF EXAM:  3/7/2022 9:28 AM     PROCEDURE:  US RENAL BILATERAL-     INDICATIONS:  urbano; M62.82-Rhabdomyolysis; R11.2-Nausea with vomiting, unspecified;  N17.9-Acute kidney failure, unspecified     COMPARISON:  CT abdomen and pelvis 12/6/2012.     TECHNIQUE:   Grayscale and color Doppler ultrasound evaluation of the kidneys and  urinary bladder was performed.        FINDINGS:  The right kidney measures 11.2 x 4.9 x 5.6 cm.. The left kidney   measures 10.5 x 4.2 x 5.1 cm. Both kidneys maintain normal cortical  thickness and echotexture. No cystic or solid renal lesion, shadowing  stone, or hydronephrosis is seen. Urinary bladder has a normal  appearance with a prevoid volume of 200 cc.             Impression:      Normal renal ultrasound.     Electronically Signed By-Monie Brunner MD On:3/7/2022 9:44 AM  This report was finalized on [id number] by  Monie Brunner MD.                      ASSESSMENT / PLAN      Non-traumatic rhabdomyolysis      · RUBANO-due to volume depletion and/or mild ATN from rhabdomyolysis.  UA was negative.  Ultrasound normal  · Rhabdomyolysis-trend CK  · Dehydration    Continue IV hydration, trend CK, check labs this a.m.      I discussed the patients findings and my recommendations with patient, family and consulting provider    Will follow along closely. Thank you for allowing us to see this patient in renal consultation.    [facility]  [phone]  MD Pato Cam MD  03/07/22  11:26 EST

## 2022-03-08 VITALS
DIASTOLIC BLOOD PRESSURE: 65 MMHG | WEIGHT: 222.88 LBS | HEART RATE: 53 BPM | OXYGEN SATURATION: 96 % | SYSTOLIC BLOOD PRESSURE: 100 MMHG | RESPIRATION RATE: 18 BRPM | TEMPERATURE: 97.4 F | BODY MASS INDEX: 31.2 KG/M2 | HEIGHT: 71 IN

## 2022-03-08 PROBLEM — K52.9 ACUTE GASTROENTERITIS: Status: ACTIVE | Noted: 2022-03-08

## 2022-03-08 PROBLEM — N17.9 AKI (ACUTE KIDNEY INJURY) (HCC): Status: ACTIVE | Noted: 2022-03-08

## 2022-03-08 LAB
ALBUMIN SERPL-MCNC: 3.4 G/DL (ref 3.5–5.2)
ANION GAP SERPL CALCULATED.3IONS-SCNC: 8 MMOL/L (ref 5–15)
BASOPHILS # BLD AUTO: 0 10*3/MM3 (ref 0–0.2)
BASOPHILS NFR BLD AUTO: 0.2 % (ref 0–1.5)
BUN SERPL-MCNC: 8 MG/DL (ref 6–20)
BUN/CREAT SERPL: 7.1 (ref 7–25)
CALCIUM SPEC-SCNC: 7.2 MG/DL (ref 8.6–10.5)
CHLORIDE SERPL-SCNC: 106 MMOL/L (ref 98–107)
CK SERPL-CCNC: 437 U/L (ref 20–200)
CO2 SERPL-SCNC: 25 MMOL/L (ref 22–29)
CREAT SERPL-MCNC: 1.12 MG/DL (ref 0.76–1.27)
DEPRECATED RDW RBC AUTO: 38.9 FL (ref 37–54)
EGFRCR SERPLBLD CKD-EPI 2021: 86.8 ML/MIN/1.73
EOSINOPHIL # BLD AUTO: 0 10*3/MM3 (ref 0–0.4)
EOSINOPHIL NFR BLD AUTO: 1.7 % (ref 0.3–6.2)
ERYTHROCYTE [DISTWIDTH] IN BLOOD BY AUTOMATED COUNT: 12.4 % (ref 12.3–15.4)
GLUCOSE SERPL-MCNC: 89 MG/DL (ref 65–99)
HCT VFR BLD AUTO: 35.6 % (ref 37.5–51)
HGB BLD-MCNC: 12.2 G/DL (ref 13–17.7)
LYMPHOCYTES # BLD AUTO: 0.7 10*3/MM3 (ref 0.7–3.1)
LYMPHOCYTES NFR BLD AUTO: 25.2 % (ref 19.6–45.3)
MCH RBC QN AUTO: 30.6 PG (ref 26.6–33)
MCHC RBC AUTO-ENTMCNC: 34.4 G/DL (ref 31.5–35.7)
MCV RBC AUTO: 89 FL (ref 79–97)
MONOCYTES # BLD AUTO: 0.3 10*3/MM3 (ref 0.1–0.9)
MONOCYTES NFR BLD AUTO: 12.2 % (ref 5–12)
NEUTROPHILS NFR BLD AUTO: 1.7 10*3/MM3 (ref 1.7–7)
NEUTROPHILS NFR BLD AUTO: 60.7 % (ref 42.7–76)
NRBC BLD AUTO-RTO: 0.2 /100 WBC (ref 0–0.2)
PHOSPHATE SERPL-MCNC: 2.6 MG/DL (ref 2.5–4.5)
PLATELET # BLD AUTO: 169 10*3/MM3 (ref 140–450)
PMV BLD AUTO: 8.5 FL (ref 6–12)
POTASSIUM SERPL-SCNC: 3.5 MMOL/L (ref 3.5–5.2)
RBC # BLD AUTO: 4 10*6/MM3 (ref 4.14–5.8)
SODIUM SERPL-SCNC: 139 MMOL/L (ref 136–145)
WBC NRBC COR # BLD: 2.8 10*3/MM3 (ref 3.4–10.8)

## 2022-03-08 PROCEDURE — G0378 HOSPITAL OBSERVATION PER HR: HCPCS

## 2022-03-08 PROCEDURE — 25010000002 ONDANSETRON PER 1 MG: Performed by: NURSE PRACTITIONER

## 2022-03-08 PROCEDURE — 96361 HYDRATE IV INFUSION ADD-ON: CPT

## 2022-03-08 PROCEDURE — 96376 TX/PRO/DX INJ SAME DRUG ADON: CPT

## 2022-03-08 RX ORDER — ACETAMINOPHEN 325 MG/1
650 TABLET ORAL EVERY 4 HOURS PRN
Start: 2022-03-08

## 2022-03-08 RX ADMIN — SODIUM CHLORIDE 150 ML/HR: 9 INJECTION, SOLUTION INTRAVENOUS at 00:22

## 2022-03-08 RX ADMIN — ONDANSETRON 4 MG: 2 INJECTION INTRAMUSCULAR; INTRAVENOUS at 09:47

## 2022-03-08 RX ADMIN — Medication 3 ML: at 09:02

## 2022-03-08 NOTE — DISCHARGE SUMMARY
Date of Discharge:  3/8/2022    Discharge Diagnosis:   Non traumatic rhabdomyolysis  Acute gastroenteritis  FAINA    Presenting Problem/History of Present Illness  Active Hospital Problems    Diagnosis  POA   • **Non-traumatic rhabdomyolysis [M62.82]  Yes   • Acute gastroenteritis [K52.9]  Yes   • FAINA (acute kidney injury) (HCC) [N17.9]  Yes      Resolved Hospital Problems   No resolved problems to display.          Hospital Course    Patient is a 36 y.o. male presented with previously healthy,  who presents with generalized weakness, vomiting and no urine output during the course of the day.  He awoke the morning of admission with mild nausea. He took 800 mg ibuprofen and then ran outside for 13 miles, which is not unusual for him.  Afterward he had one episode of diarrhea and multiple episodes of vomiting. He had no known ill exposures, no fever/chills.  In the ER his CPK was elevated, creatinine was elevated. He has been treated over night with IV fluids.  He is better this am but still nauseous with poor appetite. He has urinated twice since admission.  BP is low. Nephrology was consulted and creatinine CK improved with hydration. Patient is feeling better and tolerating some clear liquid. He is ready for discharge and will stick to bland diet and encourage fluid intake. He is to refrain from exercise until follow up with CK lab draw this week and follow up with nephrologists in 2 weeks.     Procedures Performed         Consults:   Consults     Date and Time Order Name Status Description    3/7/2022 12:34 AM IP Consult to Nephrology Completed           Pertinent Test Results:    Lab Results (most recent)     Procedure Component Value Units Date/Time    CBC & Differential [[id number]]  (Abnormal) Collected: 03/07/22 9914    Specimen: Blood Updated: 03/08/22 0122    Narrative:      The following orders were created for panel order CBC & Differential.  Procedure                               Abnormality          Status                     ---------                               -----------         ------                     CBC Auto Differential[[id number]]        Abnormal            Final result                 Please view results for these tests on the individual orders.    CBC Auto Differential [[id number]]  (Abnormal) Collected: 03/07/22 2359    Specimen: Blood Updated: 03/08/22 0122     WBC 2.80 10*3/mm3      RBC 4.00 10*6/mm3      Hemoglobin 12.2 g/dL      Comment: Result checked         Hematocrit 35.6 %      Comment: Result checked         MCV 89.0 fL      MCH 30.6 pg      MCHC 34.4 g/dL      RDW 12.4 %      RDW-SD 38.9 fl      MPV 8.5 fL      Platelets 169 10*3/mm3      Neutrophil % 60.7 %      Lymphocyte % 25.2 %      Monocyte % 12.2 %      Eosinophil % 1.7 %      Basophil % 0.2 %      Neutrophils, Absolute 1.70 10*3/mm3      Lymphocytes, Absolute 0.70 10*3/mm3      Monocytes, Absolute 0.30 10*3/mm3      Eosinophils, Absolute 0.00 10*3/mm3      Basophils, Absolute 0.00 10*3/mm3      nRBC 0.2 /100 WBC     Renal Function Panel [[id number]]  (Abnormal) Collected: 03/07/22 2359    Specimen: Blood Updated: 03/08/22 0106     Glucose 89 mg/dL      BUN 8 mg/dL      Creatinine 1.12 mg/dL      Sodium 139 mmol/L      Potassium 3.5 mmol/L      Chloride 106 mmol/L      CO2 25.0 mmol/L      Calcium 7.2 mg/dL      Albumin 3.40 g/dL      Phosphorus 2.6 mg/dL      Anion Gap 8.0 mmol/L      BUN/Creatinine Ratio 7.1     eGFR 86.8 mL/min/1.73      Comment: National Kidney Foundation and American Society of Nephrology (ASN) Task Force recommended calculation based on the Chronic Kidney Disease Epidemiology Collaboration (CKD-EPI) equation refit without adjustment for race.       Narrative:      GFR Normal >60  Chronic Kidney Disease <60  Kidney Failure <15      CK [[id number]]  (Abnormal) Collected: 03/07/22 2359    Specimen: Blood Updated: 03/08/22 0106     Creatine Kinase 437 U/L     TSH [[id number]]  (Normal) Collected: 03/07/22 1204     Specimen: Blood Updated: 03/07/22 1401     TSH 1.520 uIU/mL     Basic Metabolic Panel [[id number]]  (Abnormal) Collected: 03/07/22 1204    Specimen: Blood Updated: 03/07/22 1308     Glucose 94 mg/dL      BUN 13 mg/dL      Creatinine 1.10 mg/dL      Sodium 140 mmol/L      Potassium 3.7 mmol/L      Chloride 104 mmol/L      CO2 26.0 mmol/L      Calcium 7.7 mg/dL      BUN/Creatinine Ratio 11.8     Anion Gap 10.0 mmol/L      eGFR 88.7 mL/min/1.73      Comment: National Kidney Foundation and American Society of Nephrology (ASN) Task Force recommended calculation based on the Chronic Kidney Disease Epidemiology Collaboration (CKD-EPI) equation refit without adjustment for race.       Narrative:      GFR Normal >60  Chronic Kidney Disease <60  Kidney Failure <15      CK [[id number]]  (Abnormal) Collected: 03/07/22 1204    Specimen: Blood Updated: 03/07/22 1308     Creatine Kinase 549 U/L     Respiratory Panel PCR w/COVID-19(SARS-CoV-2) JOSE ANTONIO/LEANNA/VALE/[facility] In-House, NP Swab in UTM/VTM, 3-4 HR TAT - Swab, Nasopharynx [[id number]]  (Normal) Collected: 03/06/22 2212    Specimen: Swab from Nasopharynx Updated: 03/06/22 2307     ADENOVIRUS, PCR Not Detected     Coronavirus 229E Not Detected     Coronavirus HKU1 Not Detected     Coronavirus NL63 Not Detected     Coronavirus OC43 Not Detected     COVID19 Not Detected     Human Metapneumovirus Not Detected     Human Rhinovirus/Enterovirus Not Detected     Influenza A PCR Not Detected     Influenza B PCR Not Detected     Parainfluenza Virus 1 Not Detected     Parainfluenza Virus 2 Not Detected     Parainfluenza Virus 3 Not Detected     Parainfluenza Virus 4 Not Detected     RSV, PCR Not Detected     Bordetella pertussis pcr Not Detected     Bordetella parapertussis PCR Not Detected     Chlamydophila pneumoniae PCR Not Detected     Mycoplasma pneumo by PCR Not Detected    Narrative:      In the setting of a positive respiratory panel with a viral infection PLUS a negative  procalcitonin without other underlying concern for bacterial infection, consider observing off antibiotics or discontinuation of antibiotics and continue supportive care. If the respiratory panel is positive for atypical bacterial infection (Bordetella pertussis, Chlamydophila pneumoniae, or Mycoplasma pneumoniae), consider antibiotic de-escalation to target atypical bacterial infection.    Urinalysis With Culture If Indicated - Urine, Catheter In/Out [[id number]]  (Abnormal) Collected: 03/06/22 2242    Specimen: Urine, Catheter In/Out Updated: 03/06/22 2250     Color, UA Dark Yellow     Appearance, UA Clear     pH, UA 6.0     Specific Gravity, UA 1.033     Glucose, UA Negative     Ketones, UA Trace     Bilirubin, UA Negative     Blood, UA Negative     Protein, UA Negative     Leuk Esterase, UA Negative     Nitrite, UA Negative     Urobilinogen, UA 1.0 E.U./dL    Narrative:      Urine microscopic not indicated.    Comprehensive Metabolic Panel [[id number]]  (Abnormal) Collected: 03/06/22 2201    Specimen: Blood Updated: 03/06/22 2250     Glucose 86 mg/dL      BUN 21 mg/dL      Creatinine 1.31 mg/dL      Sodium 140 mmol/L      Potassium 4.2 mmol/L      Chloride 101 mmol/L      CO2 27.0 mmol/L      Calcium 9.2 mg/dL      Total Protein 7.6 g/dL      Albumin 4.90 g/dL      ALT (SGPT) 46 U/L      AST (SGOT) 61 U/L      Alkaline Phosphatase 65 U/L      Total Bilirubin 1.0 mg/dL      Globulin 2.7 gm/dL      A/G Ratio 1.8 g/dL      BUN/Creatinine Ratio 16.0     Anion Gap 12.0 mmol/L      eGFR 71.9 mL/min/1.73      Comment: National Kidney Foundation and American Society of Nephrology (ASN) Task Force recommended calculation based on the Chronic Kidney Disease Epidemiology Collaboration (CKD-EPI) equation refit without adjustment for race.       Narrative:      GFR Normal >60  Chronic Kidney Disease <60  Kidney Failure <15      Lipase [[id number]]  (Normal) Collected: 03/06/22 2201    Specimen: Blood Updated: 03/06/22 2250      Lipase 30 U/L     CBC & Differential [[id number]]  (Abnormal) Collected: 03/06/22 2201    Specimen: Blood Updated: 03/06/22 2218    Narrative:      The following orders were created for panel order CBC & Differential.  Procedure                               Abnormality         Status                     ---------                               -----------         ------                     CBC Auto Differential[[id number]]        Abnormal            Final result                 Please view results for these tests on the individual orders.    CBC Auto Differential [[id number]]  (Abnormal) Collected: 03/06/22 2201    Specimen: Blood Updated: 03/06/22 2218     WBC 11.70 10*3/mm3      RBC 5.15 10*6/mm3      Hemoglobin 15.6 g/dL      Hematocrit 45.0 %      MCV 87.4 fL      MCH 30.2 pg      MCHC 34.5 g/dL      RDW 12.6 %      RDW-SD 38.9 fl      MPV 8.6 fL      Platelets 250 10*3/mm3      Neutrophil % 90.6 %      Lymphocyte % 5.3 %      Monocyte % 3.3 %      Eosinophil % 0.6 %      Basophil % 0.2 %      Neutrophils, Absolute 10.60 10*3/mm3      Lymphocytes, Absolute 0.60 10*3/mm3      Monocytes, Absolute 0.40 10*3/mm3      Eosinophils, Absolute 0.10 10*3/mm3      Basophils, Absolute 0.00 10*3/mm3      nRBC 0.2 /100 WBC                 Condition on Discharge:  Stable    Vital Signs  Temp:  [97.4 °F (36.3 °C)-99 °F (37.2 °C)] 97.4 °F (36.3 °C)  Heart Rate:  [53-70] 53  Resp:  [18-20] 18  BP: ()/(58-65) 100/65      Physical Exam  Vitals reviewed.   Constitutional:       Appearance: Normal appearance. He is not ill-appearing.   HENT:      Head: Normocephalic and atraumatic.      Right Ear: External ear normal.      Left Ear: External ear normal.      Nose: Nose normal.      Mouth/Throat:      Mouth: Mucous membranes are moist.   Eyes:      General:         Right eye: No discharge.   Cardiovascular:      Rate and Rhythm: Normal rate and regular rhythm.      Pulses: Normal pulses.      Heart sounds: Normal heart sounds.    Pulmonary:      Effort: Pulmonary effort is normal. No respiratory distress.      Breath sounds: Normal breath sounds.   Abdominal:      General: Bowel sounds are normal.      Palpations: Abdomen is soft.      Tenderness: There is no abdominal tenderness. There is no guarding.   Musculoskeletal:         General: Normal range of motion.      Cervical back: Normal range of motion.   Skin:     General: Skin is warm and dry.   Neurological:      Mental Status: He is alert and oriented to person, place, and time.   Psychiatric:         Behavior: Behavior normal.              Discharge Disposition  Home or Self Care    Discharge Medications     Discharge Medications      New Medications      Instructions Start Date   acetaminophen 325 MG tablet  Commonly known as: TYLENOL   650 mg, Oral, Every 4 Hours PRN         Continue These Medications      Instructions Start Date   multivitamin with fluoride/iron 0.25-10 MG/ML solution solution   Oral, Daily      Vitamin D (Cholecalciferol) 10 MCG (400 UNIT) tablet  Commonly known as: CHOLECALCIFEROL   400 Units, Oral, Daily             Discharge Diet:   Diet Instructions    Regular diet as tolerated           Activity at Discharge:   Activity Instructions    As Tolerated             Follow-up Appointments  No future appointments.  Additional Instructions for the Follow-ups that You Need to Schedule     Discharge Follow-up with PCP   As directed       Currently Documented PCP:    Danitza Garcia MD    PCP Phone Number:    [phone]     Follow Up Details: as needed         Discharge Follow-up with Specified Provider: Call [facility] to schedule a lab only appointment later this week- no need to fast   As directed      To: Call [facility] to schedule a lab only appointment later this week- no need to fast         Discharge Follow-up with Specified Provider: Dr. Cardoza or Ronnie - [facility] specialists; 2 Weeks   As directed      To: Dr. Cardoza or Ronnie - [facility]    Follow Up: 2  Weeks               Test Results Pending at Discharge       Jessica Barrett, MARGARITO  03/08/22  15:45 EST    Time: Discharge 25 min

## 2022-03-08 NOTE — PLAN OF CARE
Goal Outcome Evaluation:  Plan of Care Reviewed With: patient        Progress: improving  Outcome Evaluation: Patients CK down to 437 today.  Pt continues to receive NS at 150 cc/hr.  Has had occasional diarrhea stools.  Took oral zofran for nausea with good results.  He has voiced no complaints tonight. Up in room ad franko.

## 2022-03-08 NOTE — PROGRESS NOTES
"NEPHROLOGY PROGRESS NOTE------[facility]    [facility]  [phone]  Pato Cardoza MD      Patient Care Team:  Danitza Garcia MD as PCP - General  Danitza Garcia MD as PCP - Family Medicine  Pato Cardoza MD as Consulting Physician (Nephrology)      Provider:  Pato Cardoza MD  Patient Name: Jac Perez  :  1985    SUBJECTIVE:  Follow-up FAINA/rhabdo  No chest pain shortness of breath    Medication:  sodium chloride, 3 mL, Intravenous, Q12H      sodium chloride, 150 mL/hr, Last Rate: 150 mL/hr (22)        OBJECTIVE    Vital Sign Min/Max for last 24 hours  Temp  Min: 97.4 °F (36.3 °C)  Max: 99 °F (37.2 °C)   BP  Min: 97/58  Max: 102/65   Pulse  Min: 53  Max: 70   Resp  Min: 18  Max: 20   SpO2  Min: 95 %  Max: 96 %   No data recorded   Weight  Min: 97.7 kg (215 lb 6.4 oz)  Max: 101 kg (222 lb 14.2 oz)     Flowsheet Rows    Flowsheet Row First Filed Value   Admission Height 180.3 cm (71\") Documented at 2022   Admission Weight 96.2 kg (212 lb) Documented at 2022          I/O this shift:  In: 480 [P.O.:480]  Out: 1200 [Urine:1200]  I/O last 3 completed shifts:  In: 7662 [P.O.:1390; I.V.:4772; IV Piggyback:1500]  Out: 3350 [Urine:3350]    Physical Exam:  General Appearance: alert, appears stated age and cooperative  Head: normocephalic, without obvious abnormality and atraumatic  Eyes: conjunctivae and sclerae normal and no icterus  Neck: supple and no JVD  Lungs: clear to auscultation and respirations regular  Heart: regular rhythm & normal rate and normal S1, S2  Chest: Wall no abnormalities observed  Abdomen: normal bowel sounds and soft non-tender  Extremities: moves extremities well, no edema, no cyanosis and no redness  Skin: no bleeding, bruising or rash, turgor normal, color normal and no lesions noted  Neurologic: Alert, and oriented. No focal deficits    Labs:    WBC WBC   Date Value Ref Range Status "   03/07/2022 2.80 (L) 3.40 - 10.80 10*3/mm3 Final   03/06/2022 11.70 (H) 3.40 - 10.80 10*3/mm3 Final      HGB Hemoglobin   Date Value Ref Range Status   03/07/2022 12.2 (L) 13.0 - 17.7 g/dL Final     Comment:     Result checked    03/06/2022 15.6 13.0 - 17.7 g/dL Final      HCT Hematocrit   Date Value Ref Range Status   03/07/2022 35.6 (L) 37.5 - 51.0 % Final     Comment:     Result checked    03/06/2022 45.0 37.5 - 51.0 % Final      Platlets No results found for: LABPLAT   MCV MCV   Date Value Ref Range Status   03/07/2022 89.0 79.0 - 97.0 fL Final   03/06/2022 87.4 79.0 - 97.0 fL Final          Sodium Sodium   Date Value Ref Range Status   03/07/2022 139 136 - 145 mmol/L Final   03/07/2022 140 136 - 145 mmol/L Final   03/06/2022 140 136 - 145 mmol/L Final      Potassium Potassium   Date Value Ref Range Status   03/07/2022 3.5 3.5 - 5.2 mmol/L Final   03/07/2022 3.7 3.5 - 5.2 mmol/L Final   03/06/2022 4.2 3.5 - 5.2 mmol/L Final      Chloride Chloride   Date Value Ref Range Status   03/07/2022 106 98 - 107 mmol/L Final   03/07/2022 104 98 - 107 mmol/L Final   03/06/2022 101 98 - 107 mmol/L Final      CO2 CO2   Date Value Ref Range Status   03/07/2022 25.0 22.0 - 29.0 mmol/L Final   03/07/2022 26.0 22.0 - 29.0 mmol/L Final   03/06/2022 27.0 22.0 - 29.0 mmol/L Final      BUN BUN   Date Value Ref Range Status   03/07/2022 8 6 - 20 mg/dL Final   03/07/2022 13 6 - 20 mg/dL Final   03/06/2022 21 (H) 6 - 20 mg/dL Final      Creatinine Creatinine   Date Value Ref Range Status   03/07/2022 1.12 0.76 - 1.27 mg/dL Final   03/07/2022 1.10 0.76 - 1.27 mg/dL Final   03/06/2022 1.31 (H) 0.76 - 1.27 mg/dL Final      Calcium Calcium   Date Value Ref Range Status   03/07/2022 7.2 (L) 8.6 - 10.5 mg/dL Final   03/07/2022 7.7 (L) 8.6 - 10.5 mg/dL Final   03/06/2022 9.2 8.6 - 10.5 mg/dL Final      PO4 No components found for: PO4   Albumin Albumin   Date Value Ref Range Status   03/07/2022 3.40 (L) 3.50 - 5.20 g/dL Final   03/06/2022  4.90 3.50 - 5.20 g/dL Final      Magnesium No results found for: MG   Uric Acid No components found for: URIC ACID     Imaging Results (Last 72 Hours)     Procedure Component Value Units Date/Time    US Renal Bilateral [[id number]] Collected: 03/07/22 0942     Updated: 03/07/22 0946    Narrative:      DATE OF EXAM:  3/7/2022 9:28 AM     PROCEDURE:  US RENAL BILATERAL-     INDICATIONS:  urbano; M62.82-Rhabdomyolysis; R11.2-Nausea with vomiting, unspecified;  N17.9-Acute kidney failure, unspecified     COMPARISON:  CT abdomen and pelvis 12/6/2012.     TECHNIQUE:   Grayscale and color Doppler ultrasound evaluation of the kidneys and  urinary bladder was performed.        FINDINGS:  The right kidney measures 11.2 x 4.9 x 5.6 cm.. The left kidney   measures 10.5 x 4.2 x 5.1 cm. Both kidneys maintain normal cortical  thickness and echotexture. No cystic or solid renal lesion, shadowing  stone, or hydronephrosis is seen. Urinary bladder has a normal  appearance with a prevoid volume of 200 cc.             Impression:      Normal renal ultrasound.     Electronically Signed By-Monie Brunner MD On:3/7/2022 9:44 AM  This report was finalized on [id number] by  Monie Brunner MD.                    ASSESSMENT / PLAN      Non-traumatic rhabdomyolysis    · URBANO-due to volume depletion and/or rhabdomyolysis  · Rhabdomyolysis-nontraumatic  · Dehydration     Creatinine better, CK trending down  Stop IV fluids per current bag infused  TSH normal  Renal wise no objection to discharge  BMP and total CK later this week and follow-up in office in 2 weeks        Pato Cardoza MD  [facility]  [phone]  03/08/22  12:30 EST

## 2022-03-08 NOTE — CASE MANAGEMENT/SOCIAL WORK
Continued Stay Note  BLAIRE Barcenas     Patient Name: Jac Perez  MRN: [id number]  Today's Date: 3/8/2022    Admit Date: 3/6/2022     Discharge Plan     Row Name 03/08/22 1256       Plan    Plan Anticipate routine home    Plan Comments Potential d/c today if able to tolerate diet.              Phone communication or documentation only - no physical contact with patient or family.      Alexa Cooney RN

## 2022-03-08 NOTE — CASE MANAGEMENT/SOCIAL WORK
Continued Stay Note  BLAIRE Barcenas     Patient Name: Jac Perez  MRN: [id number]  Today's Date: 3/8/2022    Admit Date: 3/6/2022     Discharge Plan     Row Name 03/08/22 1354       Plan    Plan Comments Discharge orders noted    Final Discharge Disposition Code 01 - home or self-care    Final Note Home                             Alexa Cooney RN

## 2022-03-08 NOTE — PLAN OF CARE
Problem: Adult Inpatient Plan of Care  Goal: Plan of Care Review  Outcome: Met  Flowsheets (Taken 3/8/2022 1348)  Progress: improving  Plan of Care Reviewed With: patient  Outcome Evaluation: Discharge  Goal: Patient-Specific Goal (Individualized)  Outcome: Met  Goal: Absence of Hospital-Acquired Illness or Injury  Outcome: Met  Intervention: Identify and Manage Fall Risk  Recent Flowsheet Documentation  Taken 3/8/2022 1100 by Sahra Reina LPN  Safety Promotion/Fall Prevention:   safety round/check completed   room organization consistent   nonskid shoes/slippers when out of bed   fall prevention program maintained   clutter free environment maintained   assistive device/personal items within reach   activity supervised  Taken 3/8/2022 0750 by Sahra Reina LPN  Safety Promotion/Fall Prevention:   safety round/check completed   room organization consistent   nonskid shoes/slippers when out of bed   clutter free environment maintained   assistive device/personal items within reach   activity supervised  Intervention: Prevent Skin Injury  Recent Flowsheet Documentation  Taken 3/8/2022 0750 by Sahra Reina LPN  Body Position: position changed independently  Intervention: Prevent and Manage VTE (Venous Thromboembolism) Risk  Recent Flowsheet Documentation  Taken 3/8/2022 0750 by Sahra Reina LPN  Activity Management: ambulated to bathroom  VTE Prevention/Management:   bilateral   sequential compression devices off  Intervention: Prevent Infection  Recent Flowsheet Documentation  Taken 3/8/2022 1100 by Sahra Reina LPN  Infection Prevention:   personal protective equipment utilized   hand hygiene promoted   equipment surfaces disinfected   environmental surveillance performed  Taken 3/8/2022 0750 by Sahra Reina LPN  Infection Prevention:   personal protective equipment utilized   equipment surfaces disinfected   hand hygiene promoted   environmental surveillance performed  Goal: Optimal Comfort and  Wellbeing  Outcome: Met  Intervention: Provide Person-Centered Care  Recent Flowsheet Documentation  Taken 3/8/2022 1100 by Sahra Reina LPN  Trust Relationship/Rapport: thoughts/feelings acknowledged  Taken 3/8/2022 0750 by Sahra Reina LPN  Trust Relationship/Rapport: thoughts/feelings acknowledged  Goal: Readiness for Transition of Care  Outcome: Met     Problem: Fluid and Electrolyte Imbalance (Acute Kidney Injury/Impairment)  Goal: Fluid and Electrolyte Balance  Outcome: Met  Intervention: Monitor and Manage Fluid and Electrolyte Balance  Recent Flowsheet Documentation  Taken 3/8/2022 0750 by Sahra Reina LPN  Fluid/Electrolyte Management: fluids provided     Problem: Oral Intake Inadequate (Acute Kidney Injury/Impairment)  Goal: Optimal Nutrition Intake  Outcome: Met     Problem: Renal Function Impairment (Acute Kidney Injury/Impairment)  Goal: Effective Renal Function  Outcome: Met  Intervention: Monitor and Support Renal Function  Recent Flowsheet Documentation  Taken 3/8/2022 1100 by Sahra Reina LPN  Medication Review/Management: medications reviewed     Problem: Pain Acute  Goal: Acceptable Pain Control and Functional Ability  Outcome: Met  Intervention: Prevent or Manage Pain  Recent Flowsheet Documentation  Taken 3/8/2022 1100 by Sahra Reina LPN  Medication Review/Management: medications reviewed  Intervention: Optimize Psychosocial Wellbeing  Recent Flowsheet Documentation  Taken 3/8/2022 1100 by Sahra Reina LPN  Supportive Measures:   active listening utilized   self-care encouraged  Diversional Activities:   television   smartphone  Taken 3/8/2022 0750 by Sahra Reina LPN  Supportive Measures:   active listening utilized   decision-making supported  Diversional Activities:   television   smartphone   Goal Outcome Evaluation:  Plan of Care Reviewed With: patient        Progress: improving  Outcome Evaluation: Discharge

## 2025-05-15 ENCOUNTER — HOSPITAL ENCOUNTER (EMERGENCY)
Facility: HOSPITAL | Age: 40
Discharge: HOME | End: 2025-05-15
Attending: EMERGENCY MEDICINE
Payer: COMMERCIAL

## 2025-05-15 ENCOUNTER — APPOINTMENT (OUTPATIENT)
Dept: CT IMAGING | Facility: HOSPITAL | Age: 40
End: 2025-05-15
Payer: COMMERCIAL

## 2025-05-15 VITALS
HEART RATE: 71 BPM | RESPIRATION RATE: 18 BRPM | WEIGHT: 200.7 LBS | DIASTOLIC BLOOD PRESSURE: 70 MMHG | BODY MASS INDEX: 28.73 KG/M2 | SYSTOLIC BLOOD PRESSURE: 107 MMHG | HEIGHT: 70 IN | OXYGEN SATURATION: 98 % | TEMPERATURE: 99.4 F

## 2025-05-15 DIAGNOSIS — S09.90XA INJURY OF HEAD, INITIAL ENCOUNTER: Primary | ICD-10-CM

## 2025-05-15 PROCEDURE — 99282 EMERGENCY DEPT VISIT SF MDM: CPT | Performed by: EMERGENCY MEDICINE

## 2025-05-15 PROCEDURE — 70450 CT HEAD/BRAIN W/O DYE: CPT

## 2025-05-15 PROCEDURE — 99284 EMERGENCY DEPT VISIT MOD MDM: CPT

## 2025-05-15 PROCEDURE — G0463 HOSPITAL OUTPT CLINIC VISIT: HCPCS | Performed by: EMERGENCY MEDICINE

## 2025-05-16 NOTE — [facility] PROVIDER NOTE
Subjective   History of Present Illness    Patient 39-year-old man who presents after sustaining a head injury while doing a gym workout.  Patient was doing a dead lift was holding his breath and then became lightheaded and passed out striking his head against the wall.  No vomiting.  Patient feels back to normal self now other than having mild pain to the back of the head where he struck his head.  No visual changes or numbness or weakness.  He initially had no neck pain but is developing some stiffness to the right side of the neck since the injury.  No chest pain or shortness of breath or abdominal pain.    Review of Systems    Past Medical History:   Diagnosis Date    Rhabdomyolysis        No Known Allergies    History reviewed. No pertinent surgical history.    History reviewed. No pertinent family history.    Social History     Socioeconomic History    Marital status:    Tobacco Use    Smoking status: Never    Smokeless tobacco: Never   Substance and Sexual Activity    Drug use: Never    Sexual activity: Defer           Objective   Physical Exam  Vitals and nursing note reviewed.   Constitutional:       General: He is not in acute distress.     Appearance: Normal appearance. He is not ill-appearing or toxic-appearing.   HENT:      Head: Normocephalic and atraumatic.      Comments: Tenderness to the posterior scalp on the left without any hematoma or open wounds.  No crepitus.  No Rolon sign or raccoon eyes.     Nose: Nose normal.      Mouth/Throat:      Mouth: Mucous membranes are moist.   Eyes:      Extraocular Movements: Extraocular movements intact.      Pupils: Pupils are equal, round, and reactive to light.   Neck:      Comments: Paraspinal right cervical tenderness without midline vertebral tenderness.  No step-off.  Cardiovascular:      Pulses: Normal pulses.   Pulmonary:      Effort: Pulmonary effort is normal.   Musculoskeletal:         General: Normal range of motion.      Cervical back:  Normal range of motion and neck supple. Tenderness present.   Skin:     General: Skin is warm and dry.      Capillary Refill: Capillary refill takes less than 2 seconds.   Neurological:      General: No focal deficit present.      Mental Status: He is alert.      Cranial Nerves: No cranial nerve deficit.      Sensory: No sensory deficit.      Motor: No weakness.      Coordination: Coordination normal.         Procedures           ED Course                                           Medical Decision Making  Problems Addressed:  Injury of head, initial encounter: complicated acute illness or injury    Amount and/or Complexity of Data Reviewed  Radiology: ordered.      Patient 39-year-old man who sustained a head injury while while doing a dead lift at the gym this evening.  He held his breath while lifting weights and then passed out causing him to fall backwards and strike his head against the wall.  There was no vomiting.  Patient is normal self now complaining of mild pain to the left posterior scalp.  He has also since developed some stiffness to the right side of the neck which was not present soon after the injury.  On examination he has a nonfocal neurological examination with a GCS of 15.  No hematoma or open wounds to the scalp.  Nonfocal neurological examination including normal finger-to-nose bilaterally.  Patient underwent CT of the head which was unremarkable but did show incidental finding of arachnoid cyst.  The patient states that he has known about this since he was age 12 when he had undergone sinus surgery.  He had undergone repeated and serial MRIs which appear to be unchanged.  Patient will follow-up with his primary provider and seek immediate medical attention anytime if having any concerns.      Final diagnoses:   Injury of head, initial encounter       ED Disposition  ED Disposition       ED Disposition   Discharge    Condition   Stable    Comment   --               Danitza Garcia MD  [id number]  [place] [address]  [phone]    In 1 week           Medication List      No changes were made to your prescriptions during this visit.

## 2025-05-16 NOTE — DISCHARGE INSTRUCTIONS
Apply cool compress to sore area for the next 2 or 3 days.  Take Tylenol and ibuprofen as needed for pain.  Recommend no strenuous activity and no activities which can result in another head injury until feeling back to normal self for at least a week.  Follow-up with your provider.  Seek immediate medical attention if having any concerns.